# Patient Record
Sex: MALE | Race: WHITE | NOT HISPANIC OR LATINO | URBAN - NONMETROPOLITAN AREA
[De-identification: names, ages, dates, MRNs, and addresses within clinical notes are randomized per-mention and may not be internally consistent; named-entity substitution may affect disease eponyms.]

---

## 2018-01-29 ENCOUNTER — OFFICE VISIT CONVERTED (OUTPATIENT)
Dept: FAMILY MEDICINE CLINIC | Facility: CLINIC | Age: 48
End: 2018-01-29
Attending: NURSE PRACTITIONER

## 2018-02-08 ENCOUNTER — OFFICE VISIT CONVERTED (OUTPATIENT)
Dept: SURGERY | Facility: CLINIC | Age: 48
End: 2018-02-08
Attending: NURSE PRACTITIONER

## 2018-02-21 ENCOUNTER — CONVERSION ENCOUNTER (OUTPATIENT)
Dept: CARDIOLOGY | Facility: CLINIC | Age: 48
End: 2018-02-21

## 2018-02-21 ENCOUNTER — OFFICE VISIT CONVERTED (OUTPATIENT)
Dept: CARDIOLOGY | Facility: CLINIC | Age: 48
End: 2018-02-21
Attending: INTERNAL MEDICINE

## 2018-02-27 ENCOUNTER — OFFICE VISIT CONVERTED (OUTPATIENT)
Dept: ORTHOPEDIC SURGERY | Facility: CLINIC | Age: 48
End: 2018-02-27
Attending: ORTHOPAEDIC SURGERY

## 2018-03-07 ENCOUNTER — OFFICE VISIT CONVERTED (OUTPATIENT)
Dept: CARDIOLOGY | Facility: CLINIC | Age: 48
End: 2018-03-07
Attending: INTERNAL MEDICINE

## 2018-03-09 ENCOUNTER — OFFICE VISIT CONVERTED (OUTPATIENT)
Dept: FAMILY MEDICINE CLINIC | Facility: CLINIC | Age: 48
End: 2018-03-09
Attending: NURSE PRACTITIONER

## 2018-04-05 ENCOUNTER — OFFICE VISIT CONVERTED (OUTPATIENT)
Dept: ONCOLOGY | Facility: HOSPITAL | Age: 48
End: 2018-04-05
Attending: NURSE PRACTITIONER

## 2018-05-03 ENCOUNTER — OFFICE VISIT CONVERTED (OUTPATIENT)
Dept: FAMILY MEDICINE CLINIC | Facility: CLINIC | Age: 48
End: 2018-05-03
Attending: NURSE PRACTITIONER

## 2018-05-03 ENCOUNTER — CONVERSION ENCOUNTER (OUTPATIENT)
Dept: FAMILY MEDICINE CLINIC | Facility: CLINIC | Age: 48
End: 2018-05-03

## 2018-05-08 ENCOUNTER — OFFICE VISIT CONVERTED (OUTPATIENT)
Dept: ORTHOPEDIC SURGERY | Facility: CLINIC | Age: 48
End: 2018-05-08
Attending: ORTHOPAEDIC SURGERY

## 2018-05-10 ENCOUNTER — OFFICE VISIT CONVERTED (OUTPATIENT)
Dept: ONCOLOGY | Facility: HOSPITAL | Age: 48
End: 2018-05-10
Attending: NURSE PRACTITIONER

## 2018-06-22 ENCOUNTER — OFFICE VISIT CONVERTED (OUTPATIENT)
Dept: FAMILY MEDICINE CLINIC | Facility: CLINIC | Age: 48
End: 2018-06-22
Attending: NURSE PRACTITIONER

## 2018-07-09 ENCOUNTER — OFFICE VISIT CONVERTED (OUTPATIENT)
Dept: FAMILY MEDICINE CLINIC | Facility: CLINIC | Age: 48
End: 2018-07-09
Attending: NURSE PRACTITIONER

## 2018-07-19 ENCOUNTER — OFFICE VISIT CONVERTED (OUTPATIENT)
Dept: ONCOLOGY | Facility: HOSPITAL | Age: 48
End: 2018-07-19
Attending: INTERNAL MEDICINE

## 2018-08-07 ENCOUNTER — OFFICE VISIT CONVERTED (OUTPATIENT)
Dept: ORTHOPEDIC SURGERY | Facility: CLINIC | Age: 48
End: 2018-08-07
Attending: ORTHOPAEDIC SURGERY

## 2018-11-16 ENCOUNTER — CONVERSION ENCOUNTER (OUTPATIENT)
Dept: FAMILY MEDICINE CLINIC | Facility: CLINIC | Age: 48
End: 2018-11-16

## 2021-05-16 VITALS
RESPIRATION RATE: 20 BRPM | SYSTOLIC BLOOD PRESSURE: 102 MMHG | DIASTOLIC BLOOD PRESSURE: 44 MMHG | HEART RATE: 65 BPM | HEIGHT: 72 IN | TEMPERATURE: 97.5 F | WEIGHT: 242.06 LBS | OXYGEN SATURATION: 100 % | BODY MASS INDEX: 32.79 KG/M2

## 2021-05-16 VITALS
DIASTOLIC BLOOD PRESSURE: 52 MMHG | OXYGEN SATURATION: 98 % | HEART RATE: 62 BPM | SYSTOLIC BLOOD PRESSURE: 127 MMHG | WEIGHT: 262.31 LBS | HEIGHT: 72 IN | TEMPERATURE: 98 F | BODY MASS INDEX: 35.53 KG/M2

## 2021-05-16 VITALS
HEART RATE: 63 BPM | HEIGHT: 72 IN | OXYGEN SATURATION: 100 % | BODY MASS INDEX: 36.06 KG/M2 | DIASTOLIC BLOOD PRESSURE: 70 MMHG | RESPIRATION RATE: 20 BRPM | SYSTOLIC BLOOD PRESSURE: 116 MMHG | WEIGHT: 266.25 LBS | TEMPERATURE: 97.1 F

## 2021-05-16 VITALS — RESPIRATION RATE: 18 BRPM | BODY MASS INDEX: 33.86 KG/M2 | HEIGHT: 72 IN | WEIGHT: 250 LBS

## 2021-05-16 VITALS
RESPIRATION RATE: 20 BRPM | DIASTOLIC BLOOD PRESSURE: 60 MMHG | HEART RATE: 62 BPM | BODY MASS INDEX: 32.95 KG/M2 | OXYGEN SATURATION: 99 % | TEMPERATURE: 97.8 F | SYSTOLIC BLOOD PRESSURE: 102 MMHG | HEIGHT: 72 IN | WEIGHT: 243.31 LBS

## 2021-05-16 VITALS
BODY MASS INDEX: 35.49 KG/M2 | SYSTOLIC BLOOD PRESSURE: 120 MMHG | HEIGHT: 72 IN | RESPIRATION RATE: 20 BRPM | OXYGEN SATURATION: 100 % | WEIGHT: 262 LBS | TEMPERATURE: 97.7 F | HEART RATE: 66 BPM | DIASTOLIC BLOOD PRESSURE: 48 MMHG

## 2021-05-16 VITALS — WEIGHT: 262 LBS | BODY MASS INDEX: 35.49 KG/M2 | HEIGHT: 72 IN | RESPIRATION RATE: 18 BRPM

## 2021-05-16 VITALS
RESPIRATION RATE: 20 BRPM | HEART RATE: 71 BPM | OXYGEN SATURATION: 96 % | BODY MASS INDEX: 32.58 KG/M2 | SYSTOLIC BLOOD PRESSURE: 110 MMHG | DIASTOLIC BLOOD PRESSURE: 82 MMHG | HEIGHT: 72 IN | WEIGHT: 240.56 LBS | TEMPERATURE: 98.4 F

## 2021-05-16 VITALS
HEIGHT: 72 IN | WEIGHT: 271 LBS | HEART RATE: 68 BPM | BODY MASS INDEX: 36.7 KG/M2 | SYSTOLIC BLOOD PRESSURE: 118 MMHG | DIASTOLIC BLOOD PRESSURE: 68 MMHG

## 2021-05-16 VITALS
WEIGHT: 268 LBS | DIASTOLIC BLOOD PRESSURE: 68 MMHG | SYSTOLIC BLOOD PRESSURE: 100 MMHG | HEART RATE: 66 BPM | BODY MASS INDEX: 36.3 KG/M2 | HEIGHT: 72 IN

## 2021-05-16 VITALS — BODY MASS INDEX: 32.78 KG/M2 | HEIGHT: 72 IN | RESPIRATION RATE: 16 BRPM | WEIGHT: 242 LBS

## 2021-05-16 VITALS — HEIGHT: 72 IN | BODY MASS INDEX: 36.3 KG/M2 | RESPIRATION RATE: 18 BRPM | WEIGHT: 268 LBS

## 2021-05-28 VITALS
BODY MASS INDEX: 39.35 KG/M2 | WEIGHT: 265.65 LBS | DIASTOLIC BLOOD PRESSURE: 70 MMHG | HEART RATE: 102 BPM | WEIGHT: 266.54 LBS | TEMPERATURE: 98 F | OXYGEN SATURATION: 100 % | SYSTOLIC BLOOD PRESSURE: 123 MMHG | RESPIRATION RATE: 20 BRPM | DIASTOLIC BLOOD PRESSURE: 55 MMHG | HEART RATE: 72 BPM | SYSTOLIC BLOOD PRESSURE: 110 MMHG | BODY MASS INDEX: 39.48 KG/M2 | HEIGHT: 69 IN | HEIGHT: 69 IN | TEMPERATURE: 98.2 F | OXYGEN SATURATION: 93 %

## 2021-05-28 VITALS
OXYGEN SATURATION: 100 % | RESPIRATION RATE: 18 BRPM | DIASTOLIC BLOOD PRESSURE: 80 MMHG | BODY MASS INDEX: 36.31 KG/M2 | WEIGHT: 245.15 LBS | SYSTOLIC BLOOD PRESSURE: 122 MMHG | TEMPERATURE: 98 F | HEART RATE: 81 BPM | HEIGHT: 69 IN

## 2021-05-28 NOTE — PROGRESS NOTES
Patient: ELODIA DONG     Acct: [id number]     Report: #[id number]  UNIT #: [id number]     : 1970    Encounter Date:2018  PRIMARY CARE:   ***Signed***  --------------------------------------------------------------------------------------------------------------------  Visit Type      Established Patient Visit            Chief Complaint      ANEMIA            Referring Provider      TETE WHITING            Allergies      Coded Allergies:             NO KNOWN ALLERGIES (Unverified , 18)            Medications      Last Reconciled on 18 22:34 by ODALYS SAENZ MD      Spironolactone (Spironolactone*) 25 Mg Tablet      25 MG PO QDAY, #30 TAB 0 Refills         Reported         18       Cyanocobalamin (Vitamin B-12*) 1,000 Mcg Tablet.er      1000 MCG PO QDAY, #30 TAB.ER         Reported         3/27/18       Ferrous Sulfate (Ferrous Sulfate*) 325 Mg Tablet      325 MG PO QDAY, #30 TAB 0 Refills         Reported         3/27/18       Gabapentin (Gabapentin) 100 Mg Capsule      200 MG PO BID, #180 CAP 0 Refills         Reported         3/27/18       Aspirin (Herbst Aspirin*) 81 Mg Tab.chew      81 MG PO QDAY, TAB.CHEW         Reported         16       Furosemide* (Lasix*) 20 Mg Tablet      20 MG PO TID, #60 TAB 0 Refills         Reported         16       Nitroglycerin (Nitroglycerin 0.4 MG/HR*) 1 Each Patch.td24      1 PATCH TRANSDERM QDAY Y for CHEST PAIN, PATCH         Reported         16       Sertraline HCl (Sertraline*) 100 Mg Tablet      100 MG PO HS, #30 TAB 0 Refills         Reported         16       Lisinopril* (Lisinopril*) 2.5 Mg Tablet      2.5 MG PO QDAY, #30 TAB 0 Refills         Reported         16       Metoprolol Tartrate (Lopressor*) 25 Mg Tablet      25 MG PO QDAY, #30 TAB 0 Refills         Reported         16       Pantoprazole (Protonix*) 20 Mg Tablet.dr      20 MG PO QDAY, TAB         Reported         16       tiZANidine HCl  (tiZANidine HCl) 4 Mg Tablet      4 MG PO TID, TAB         Reported         8/28/16       Clopidogrel Bisulfate (Plavix) 75 Mg Tablet      75 MG PO QDAY, #30 TAB 0 Refills         Reported         8/28/16       Tamsulosin HCL (Tamsulosin*) 0.4 Mg Cap.er.24h      0.4 MG PO QDAY, #30 CAP 0 Refills         Reported         8/28/16       Atorvastatin (Atorvastatin) 80 Mg Tablet      80 MG PO HS, #30 TAB 0 Refills         Reported         8/28/16       Zolpidem Tartrate (Zolpidem Tartrate*) 10 Mg Tablet      10 MG PO HS for 30 Days, #60 TAB 0 Refills         Reported         8/28/16       Topiramate (Topiramate*) 200 Mg Tablet      300 MG PO QDAY, TAB         Reported         8/28/16      Medications Reviewed:  No Changes made to meds            History and Present Illness      Past Oncology Illness History      -August 28, 2016. WbC 9.15, Hemoglobin 12.40, Platelet count 124,000, absolute     lymphocyte count 480.       -April 27, 2017. WBC 4.66, Hemoglobin 12.30, Platelet count 156,000.       -February 26, 2018. WBC 3.87, Hemoglobin 13.40, Platelet count 128,000.      -April 5, 2018. WBC 4.35, Hemoglobin 12.90, Platelet count 127,000. Absolute     reticulocyte count 38.40. Sed rate is normal. BUN 26, Creatinine 1.24. Iron 76,     TIBC 389, Iron sat 20%, Ferritin 69. PSA is normal. Hepatitis negative, HIV     negative.       -May 2, 2018. Abdominal US: 2. The liver bile ducts are normal. 3. No evidence     of ascites. 4. The spleen is at the upper limits of normal in size measuring     13.2 cm x 5.2 cm. 5. 2.4 cm x 2.8 cm round focal area of hypo echogenicity in     the left kidney is probably benign and probably represents a cyst filled with     proteinaceous debris and less likely represents a solid mass this finding has     gotten larger since the previous CT of the abdomen pelvis performed on 3/3/2016     when it measured 1.5 cm in diameter.      -May 10, 2018. Presents for follow up for pancytopenia. History of  gastric     bypass surgery 15 years ago. Denies any recent infections or any related B     symptoms or worsening bruising. Abdominal US showed spleen is at the upper     limits of normal. Patient given education on bone marrow aspiration procedure     and is agreeable to do procedure.             Mr. Ino Lux is a very pleasant  male presenting for evaluation     for anemia. On review of previous blood counts, patient has history of at least     2 cell lines down since around February of 2016 with decreased lymphocyte     counts as well. Patient reports rectal bleeding with difficulty with urination     for the last 6 months. Recent endoscopy and colonoscopy last week shows     gastritis and hiatal hernia.   Medical history includes: osteoarthritis,     Congestive heart failure, myocardial infarction, hypertension, depression,     enlarged prostrate, gastritis, hyperlipidemia, lower extremity venous stasis.     Surgical history includes: gastric bypass surgery in 2003, gallbladder removal,     hand surgery, hernia repair. Familial medical history includes: father with     some type of spine cancer, mother with breast cancer, sister with unknown     cancer. Social history: Denies any tobacco abuse, alcohol use, or substance     use. Patient is disabled but used to be a . ROS: Positive for headaches,     abdominal pain, rectal bleeding, lower extremity swelling, neuropathy, back pain    , and fatigue. Patient denies any B symptoms, itching, recent infections,     bruising more than normal.            HPI - Oncology Interim       Patient presents with fatigue. Patient described fatigue pain has improved.     Feels like generalized weakness. Noticed it since (cancer) was diagnosed. This     is a intermittent fatigue, worsened with activities of daily living, and no     associated bone or muscle pain.            ECOG Performance Status      1            PAST, FAMILY   Past Medical History      Past  Medical History:  No Diabetes Type 1, No Diabetes Type 2, No Thyroid     Disease, No COPD, No Emphysema, No Hypertension, No Stroke, No High Cholesterol    , Heart Attack (2015), No Bleeding Condition, Low or High RBC Count, Low or     High WBC Count, Low or High Platelet Coun, No Hepatitis, No Kidney Disease, No     Depression, No Alzheimer's Disease, No Mental Disease, No Seizures, Arthritis,     No Osteoporosis, No Osteopenia, No Short of Air, No Sleep apnea, No Liver     Disease, No STD, No Enlarged Prostate, No Other      Hematology/oncology:  REPORTS HX OF: Anemia, DENIES HX OF: Previous Treatment     for CA, Bladder Cancer, Blood cancer, Brain cancer, Breast cancer, Coagulopathy    , Colon Cancer, Colorectal cancer, Endocrine cancer, Eye cancer, GI cancer,      cancer, Kidney cancer, Leukemia, Leukocytosis, Leukopenia, Liver cancer, Lung     cancer, Lymphoma, Musculoskeletal cancer, Myeloma, Neurologic cancer, Oral     cancer, Pancreatic Cancer, Prostate cancer, Skin cancer, Stomach cancer,     Testicular cancer, Thrombocytopenia, Thyroid cancer, Other cancer history,     Other hematologic history            Past Surgical History      REPORTS HX OF: Cholecystectomy, Other Past Surgical Hx (GASTRIC BYPASS, HERNIA     IN PELVIS REMOVED LIKE A BOWLING BALL, HAND SURGERY-HORSE RAN OVER), DENIES HX     OF: Cataract extraction, Thyroid surgery, Lung biopsy, CABG surgery, Coronary     stent, Valve replacement, Appendectomy, Splenectomy, Bladder surgery,     Nephrectomy, Joint replacement, Frature repair, Skin cancer removal, Melanoma     excision, Spinal surgery, Breast biopsy, Lumpectomy, Mastectomy, bilateral,     Mastectomy, right, Mastectomy, left, Hysterectomy, Peg Tube Placement, VAD     Placement, Biopsy            Family History      REPORTS HX OF: Breast cancer (MOTHER), Musculoskeletal Cancer (SPINE- FATHER),     DENIES HX OF: Anemia, Blood disorders, Blood Cancer, Cervical cancer,     Coagulopathy,  Colorectal cancer, Endocrine Cancer, Eye Cancer, GI Cancer,      Cancer, Kidney Cancer, Leukemia, Leukocytosis, Leukopenia, Liver Cancer, Lung     cancer, Lymphoma, Melanoma, Myeloma, Neurologic Cancer, Oral Cancer, Ovarian     cancer, Prostate cancer, Skin Cancer, Stomach Cancer, Testicular Cancer,     Thrombocytopenia, Thyroid cancer, Uterine cancer, Other Cancer History, Other     Hematology History            Social History      Lives independently:  Yes      Occupation:  disabled            Tobacco Use      Tobacco status:  Never smoker            Alcohol Use      Alcohol intake:  None            Substance Use      Substance use:  Denies use            REVIEW OF SYSTEMS      General:  Denies: Appetite change, Excessive sweating, Fatigue, Fever, Night     sweats, Weight gain, Weight loss, Other      Eyes:  Denies: Blurred vision, Corrective lenses, Diplopia, Eye irritation, Eye     pain, Eye redness, Spots in vision, Vision loss, Other      Ears, nose, mouth, throat:  Denies: Headache, Seizures, Visual Changes, Hearing     loss, Sinus Congestion, Hoarseness, Sore throat, Other      Cardiovascular:  Denies: Chest pain, Irregular heartbeat, Palpitations, Swollen     ankles/legs, Other      Respiratory:  Denies: Chest pain, Shortness of Air, Productive cough, Coughing     blood, Other      Gastrointestinal:  Denies: Nausea, Vomiting, Problem swallowing, Frequent     heartburn, Constipation, Diarrhea, Tarry stools, Bloody stools, Unable to     control bowels, Other      Kidney/Bladder:  Denies: Painful Urination, Blood in Urine, Incontinence,     Frequent Urination, Decreased Urine Stream, Other      Musculoskeletal:  Denies: New Back pain, Leg Cramps, Painful Joints, Swollen     Joints, Muscle Pain, Muscle weakness, Other      Skin:  DENIES: Bruises Easily, Hair changes, Lesions/changes in moles, Nail     changes, Pigment changes, Rash, Other      Neurological:  Denies: Dizziness, Fainting, Numbness\Tingling,  Paralysis,     Seizures, Other      Psychiatric:  Complains of: AAO X 3, Denies: Anxiety, Panic attacks, Depression    , Memory loss, Other      Endocrine:  DiabetesThyroid DisorderOsteoporosisEndocrine Other      Hematologic/lymphatic:  Denies: Bruising, Bleeding, Enlarged Lymph Nodes,     Recurrent infections, Other            VITAL SIGNS,PAIN/FATIGUE SCORE      Vitals      Height 5 ft 8.50 in / 174 cm      Weight 245 lbs 2.424 oz / 111.2 kg      BSA 2.36 m2      BMI 36.7 kg/m2      Temperature 98 F / 36.67 C - Temporal      Pulse 81      Respirations 18      Blood Pressure 122/80 Sitting, Right Arm      Pulse Oximetry 100%, RM AIR            Pain Score      Experiencing any pain?:  No            Fatigue Score      Experiencing any fatigue?:  No            General Appearance:  Alert, Oriented X3, Cooperative      Eyes:  Anicteric Sclerae, Moist Conjunctiva, PERRLA      HEENT:  Orophraynx clear, No Erythema, No Exudates      Neck:  Supple, Full ROM, No No Masses or JVD      Respiratory:  CTAB, No Diminished Breath, No Rales      Breast\Chest:  Symmetrical, No Nipple Discharge, No Masses      Abdomen\Gastro:  Soft, No NABS, No Masses      Cardio:  RRR, No Murmur, No, Peripheral Edema, Normal PMI      Skin:  Normal Temperature, Normal Tone, Normal Texture and Turgor      Psychiatric:  Appropriate Affect, Intact Judgement, AAO x3      Neuro:  Cranial Nerve II-XII Inta, No Focal Sensory Deficit      Muscularskeletal:  Normal Gait and Station, Full ROM of extremeties, Full     muscle strength\tone      Extremities:  No Digital Cyanosis, No Digital Ischemia, Pedal Pulses Intact      Lymphatic:  No Cervical, No Supraclavicular, No Infraclavicular, No Axillary            PREVENTION      Hx Influenza Vaccination:  Yes      Date Influenza Vaccine Given:  Oct 1, 2017      Influenza Vaccine Declined:  No      2 or More Falls Past Year?:  No      Fall Past Year with Injury?:  No      Hx Pneumococcal Vaccination:  No       Encouraged to follow-up with:  PCP regarding preventative exams.      Chart initiated by      MICHELLE BARGER MA            IMPRESSION/PLAN      Plan      -Pancytopenia      -Differential diagnosis includes: Medication side effects, nutritional     deficiencies, MDS, leukemia, lymphoma, and plasma cell dyscrasia.       -CBC with differential to evaluate for worsening pancytopenia. CBC still shows     pancytopenia.       -Bone marrow on May  showed mild hypocellular bone marrow.      -Check CBC today.            -Anemia      -Check iron panel and CBC today.  Also check copper and zinc.      -Fatigue is worsening.      -Continue observation            -Thrombocytopenia      -No bleeding.      -Check peripheral smear.  Check CBC today.            -Today's Evaluation      -Patient's imaging exams, blood tests, physicians' notes, and any new findings     since our last visit were reviewed today to reassess patient's medical     treatment plan.      -Old medical records were reviewed and summarized in chronological order in the     HPI today to maintain an updated medical record.       -Patient's radiology imaging tests from our last visit were reviewed     independently by me by direct visualization of the images.        -Patients current lab tests and medications were carefully reviewed to evaluate     patient's current treatment plan today.       -Patient was advised to call us right away if there are any new symptoms for an     urgent visit for further evaluation. Patient voiced understanding and agreed to     do so.            Diagnosis      Notes      New Diagnostics      * Hepatitis Panel 4 Acute, As Soon As Possible       Dx: Pancytopenia - D61.818      * CMP Comp Metabolic Panel, Stat       Dx: Pancytopenia - D61.818      * Iron Profile, Stat       Dx: Pancytopenia - D61.818      * B12    Dx: Pancytopenia - D61.818      * Ferritin Level, As Soon As Possible       Dx: Pancytopenia - D61.818      * LDH,  Stat       Dx: Pancytopenia - D61.818      * CBC, As Soon As Possible       Dx: Pancytopenia - D61.818      * Reticulocyte Count, As Soon As Possible       Dx: Pancytopenia - D61.818      * Zinc  Serum, Stat       Dx: Pancytopenia - D61.818      * Copper Serum, Stat       Dx: Pancytopenia - D61.818      * Path Review Peripheral Smear, Stat       Dx: Pancytopenia - D61.818            Patient Education      Patient Education Provided:  Yes                 Disclaimer: Converted document may not contain table formatting or lab diagrams. Please see [employer] for the authenticated document.

## 2021-05-28 NOTE — PROGRESS NOTES
Patient: ELODIA DONG     Acct: [id number]     Report: #[id number]  UNIT #: [id number]     : 1970    Encounter Date:2018  PRIMARY CARE:   ***Signed***  --------------------------------------------------------------------------------------------------------------------  ADDENDUM: 18 1852          Addendum: Kevin Pittman on 18 @ 18:52             Patient was seen in clinic, physical exam was performed, lab and imaging tests     were noted, and above medical documentation was also done by me.  Thank you     very much for the opportunity to participate in your patient's care.  Warm     Regards.             Kevin Pittman MD FACP      Board Certified Hematologist      Board Certified Medical Oncologist     Chief Complaint      2018      ANEMIA            Current Plan      Pancytopenia      Differential diagnosis includes: Medication side effects, nutritional     deficiencies, MDS, leukemia, lymphoma, and plasma cell dyscrasia.       CBC with differential to evaluate for worsening pancytopenia. CBC still shows     pancytopenia.       Peripheral smear to evaluate for abnormal cell morphology on slide.       CMP, LDH to evaluate for hepatic or renal dysfunction.       LDH and haptoglobin to evaluate for hemolytic anemia.       Vitamin B-12, folate to evaluate for megaloblastic anemia.       Copper, zinc to evaluate for nutritional deficiency.       Flow cytometry to evaluate for abnormal circulating leukemia and lymphoma cells     in the body.       Reticulocyte count to evaluate for bone marrow function.       Ferritin, iron panel to evaluate for iron deficiency anemia. Iron studies are     normal. Taking oral iron.       Sed rate, RA profile, LUNA to evaluate for autoimmune, rheumatological     disorders. Sed rate is normal.       Urinalysis to evaluate for proteinuria and hematuria.       HIV testing and hepatitis panel to evaluate for underlying HIV or hepatitis     infections. HIV  and hepatitis were negative.       SPEP, serum free light chains to evaluate for plasma cell dyscrasia.       PSA screening to evaluate for prostrate cancer.       H-pylori to evaluate for H-pylori infection.       Celiac panel to evaluate for celiac disease.       Abdominal US to evaluate for hepatosplenomegaly.       If all testing is returned as normal, patient will likely need a bone marrow     aspiration.       Return in 4 weeks for follow up with lab results and plan of care.      Pancytopenia - D61.818            Rectal bleeding - K62.5            Difficulty in urination - R39.198            Notes      New Medications      * Spironolactone (Spironolactone*) 25 MG TABLET: 25 MG PO QDAY #30      New Diagnostics      * Hepatitis Panel 4 Acute, As Soon As Possible       Dx: Pancytopenia - D61.818      * LUNA Screen/Reflex Hep Titer, Stat       Dx: Pancytopenia - D61.818      * CMP Comp Metabolic Panel, Stat       Dx: Pancytopenia - D61.818      * Zinc  Serum, Stat       Dx: Pancytopenia - D61.818      * Copper Serum, Stat       Dx: Pancytopenia - D61.818      * Iron Profile, Stat       Dx: Pancytopenia - D61.818      * RA Profile, Stat       Dx: Pancytopenia - D61.818      * Serum Ferritin Level, As Soon As Possible       Dx: Pancytopenia - D61.818      * Psa Screening, As Soon As Possible       Dx: Pancytopenia - D61.818      * LDH, Stat       Dx: Pancytopenia - D61.818      * CBC, As Soon As Possible       Dx: Pancytopenia - D61.818      * Reticulocyte Count, As Soon As Possible       Dx: Pancytopenia - D61.818      * Sed Rate, As Soon As Possible       Dx: Pancytopenia - D61.818      * Hiv 1 By Eia W/West , Routine       Dx: Pancytopenia - D61.818      * FREE KAPPA LAMBDA LT CHAINS QU, Stat       Dx: Pancytopenia - D61.818      * Flow Cytometry Leukemia Lymph, Stat       Dx: Pancytopenia - D61.818      * Path Review Peripheral Smear, Stat       Dx: Pancytopenia - D61.818      * Haptoglobin, Stat       Dx:  Pancytopenia - D61.818      * Erythropoietin, Stat       Dx: Pancytopenia - D61.818      * Urinalysis       Dx: Pancytopenia - D61.818      * H Pylori Stool Anti, Stat       Dx: Pancytopenia - D61.818      * PROTEIN ELECTROPH SERUM, Stat       Dx: Pancytopenia - D61.818      * ABDOMEN COMPLETE, SCHEDULED PROCEDURE       Dx: Pancytopenia - D61.818      * Celiac Disease, Stat       Dx: Pancytopenia - D61.818      Intensive Monitor for Toxicity:  Labs Reviewed, Meds\Narcotics Reviewed      Labs Reviewed During Visit?:  Yes      Other      Patient was seen and examined by APRN. Patient was seen in clinic, physical     exam was performed, lab and imaging tests were noted, and above medical     documentation was also done by me. Patient information and plan of care was     reviewed with Dr. Pittman.approx.  Thank you very much for the opportunity to participate     in your patient's care.approx.  Warm Regards.      Patient Education Provided:  Yes      Additional information      Patient was seen and examined by APRN. Patient was seen in clinic, physical     exam was performed, lab and imaging tests were noted, and above medical     documentation was also done by me. Patient information and plan of care was     reviewed with Dr. Pittman.approx.  Thank you very much for the opportunity to participate     in your patient's care.approx.  Warm Regards.      ECOG Score:  1            History and Present Illness      -August 28, 2016. WbC 9.15, Hemoglobin 12.40, Platelet count 124,000, absolute     lymphocyte count 480.       -April 27, 2017. WBC 4.66, Hemoglobin 12.30, Platelet count 156,000.       -February 26, 2018. WBC 3.87, Hemoglobin 13.40, Platelet count 128,000.      -April 5, 2018. WBC 4.35, Hemoglobin 12.90, Platelet count 127,000. Absolute     reticulocyte count 38.40. Sed rate is normal. BUN 26, Creatinine 1.24. Iron 76,     TIBC 389, Iron sat 20%, Ferritin 69. PSA is normal. Hepatitis negative, HIV     negative.              Mr. Ino Lux is a very pleasant  male presenting for evaluation     for anemia. On review of previous blood counts, patient has history of at least     2 cell lines down since around February of 2016 with decreased lymphocyte     counts as well. Patient reports rectal bleeding with difficulty with urination     for the last 6 months. Recent endoscopy and colonoscopy last week shows     gastritis and hiatal hernia.   Medical history includes: osteoarthritis,     Congestive heart failure, myocardial infarction, hypertension, depression,     enlarged prostrate, gastritis, hyperlipidemia, lower extremity venous stasis.     Surgical history includes: gastric bypass surgery in 2003, gallbladder removal,     hand surgery, hernia repair. Familial medical history includes: father with     some type of spine cancer, mother with breast cancer, sister with unknown     cancer. Social history: Denies any tobacco abuse, alcohol use, or substance     use. Patient is disabled but used to be a . ROS: Positive for headaches,     abdominal pain, rectal bleeding, lower extremity swelling, neuropathy, back pain    , and fatigue. Patient denies any B symptoms, itching, recent infections,     bruising more than normal.            Vitals      Height 5 ft 8.50 in / 174 cm      Weight 266 lbs 8.578 oz / 120.9 kg      BSA 2.47 m2      BMI 39.9 kg/m2      Temperature 98.0 F / 36.67 C - Temporal      Pulse 72      Blood Pressure 110/55 Sitting, Left Arm      Pulse Oximetry 93%, ROOM AIR            Allergies      Coded Allergies:             NO KNOWN ALLERGIES (Unverified , 4/5/18)            Medications      Last Reconciled on 4/6/18 12:53 by SUDEEP ROE      Spironolactone (Spironolactone*) 25 Mg Tablet      25 MG PO QDAY, #30 TAB 0 Refills         Reported         4/5/18       Cyanocobalamin (Vitamin B-12*) 1,000 Mcg Tablet.er      1000 MCG PO QDAY, #30 TAB.ER         Reported         3/27/18       Ferrous  Sulfate (Ferrous Sulfate*) 325 Mg Tablet      325 MG PO QDAY, #30 TAB 0 Refills         Reported         3/27/18       Gabapentin (Gabapentin) 100 Mg Capsule      200 MG PO BID, #180 CAP 0 Refills         Reported         3/27/18       Aspirin (Macdona Aspirin*) 81 Mg Tab.chew      81 MG PO QDAY, TAB.CHEW         Reported         8/28/16       Furosemide* (Lasix*) 20 Mg Tablet      20 MG PO TID, #60 TAB 0 Refills         Reported         8/28/16       Nitroglycerin (Nitroglycerin 0.4 MG/HR*) 1 Each Patch.td24      1 PATCH TRANSDERM QDAY Y for CHEST PAIN, PATCH         Reported         8/28/16       Sertraline HCl (Sertraline*) 100 Mg Tablet      100 MG PO HS, #30 TAB 0 Refills         Reported         8/28/16       Lisinopril* (Lisinopril*) 2.5 Mg Tablet      2.5 MG PO QDAY, #30 TAB 0 Refills         Reported         8/28/16       Metoprolol Tartrate (Lopressor*) 25 Mg Tablet      25 MG PO QDAY, #30 TAB 0 Refills         Reported         8/28/16       Pantoprazole (Protonix*) 20 Mg Tablet.dr      20 MG PO QDAY, TAB         Reported         8/28/16       tiZANidine HCl (tiZANidine HCl) 4 Mg Tablet      4 MG PO TID, TAB         Reported         8/28/16       Clopidogrel Bisulfate (Plavix) 75 Mg Tablet      75 MG PO QDAY, #30 TAB 0 Refills         Reported         8/28/16       Tamsulosin HCL (Tamsulosin*) 0.4 Mg Cap.er.24h      0.4 MG PO QDAY, #30 CAP 0 Refills         Reported         8/28/16       Atorvastatin (Atorvastatin) 80 Mg Tablet      80 MG PO HS, #30 TAB 0 Refills         Reported         8/28/16       Zolpidem Tartrate (Zolpidem Tartrate*) 10 Mg Tablet      10 MG PO HS for 30 Days, #60 TAB 0 Refills         Reported         8/28/16       Topiramate (Topiramate*) 200 Mg Tablet      200 MG PO QDAY, TAB         Reported         8/28/16            General Information      Referring Provider:  TETE WHITING      Primary Provider:  TETE WHITING            Pain and Fatigue Scales      Pain Assessment:            Experiencing any pain?:  No         Pain Scale Used:  Numerical         Pain Intensity:  0      Fatigue:           Experiencing any fatigue?:  Yes         Fatigue (0-10 scale):  5            Past Medical History      No Diabetes Type 1, No Diabetes Type 2, No Thyroid Disease, No COPD, No     Emphysema, No Hypertension, No Stroke, No High Cholesterol, Yes Heart Attack (    2015), No Bleeding Condition, No Low or High RBC Count, No Low or High WBC Count    , No Low or High Platelet Coun, No Hepatitis, No Kidney Disease, No Depression,     No Alzheimer's Disease, No Mental Disease, No Seizures, Yes Arthritis, No     Osteoporosis, No Osteopenia, No Short of Air, No Sleep apnea, No Liver Disease,     No STD, No Enlarged Prostate, No Other      Hematology/oncology:  REPORTS HX OF: Anemia, DENIES HX OF: Previous Treatment     for CA, Bladder Cancer, Blood cancer, Brain cancer, Breast cancer, Coagulopathy    , Colon Cancer, Colorectal cancer, Endocrine cancer, Eye cancer, GI cancer,      cancer, Kidney cancer, Leukemia, Leukocytosis, Leukopenia, Liver cancer, Lung     cancer, Lymphoma, Musculoskeletal cancer, Myeloma, Neurologic cancer, Oral     cancer, Pancreatic Cancer, Prostate cancer, Skin cancer, Stomach cancer,     Testicular cancer, Thrombocytopenia, Thyroid cancer, Other cancer history,     Other hematologic history            Past Surgical History      REPORTS HX OF: Cholecystectomy, Other Past Surgical Hx (GASTRIC BYPASS, HERNIA     IN PELVIS REMOVED LIKE A BOWLING BALL, HAND SURGERY-HORSE RAN OVER), DENIES HX     OF: Cataract extraction, Thyroid surgery, Lung biopsy, CABG surgery, Coronary     stent, Valve replacement, Appendectomy, Splenectomy, Bladder surgery,     Nephrectomy, Joint replacement, Frature repair, Skin cancer removal, Melanoma     excision, Spinal surgery, Breast biopsy, Lumpectomy, Mastectomy, bilateral,     Mastectomy, right, Mastectomy, left, Hysterectomy, Peg Tube Placement, VAD      Placement, Biopsy            Family History      REPORTS HX OF: Breast cancer (MOTHER), Musculoskeletal Cancer (SPINE- FATHER),     DENIES HX OF: Anemia, Blood disorders, Blood Cancer, Cervical cancer,     Coagulopathy, Colorectal cancer, Endocrine Cancer, Eye Cancer, GI Cancer,      Cancer, Kidney Cancer, Leukemia, Leukocytosis, Leukopenia, Liver Cancer, Lung     cancer, Lymphoma, Melanoma, Myeloma, Neurologic Cancer, Oral Cancer, Ovarian     cancer, Prostate cancer, Skin Cancer, Stomach Cancer, Testicular Cancer,     Thrombocytopenia, Thyroid cancer, Uterine cancer, Other Cancer History, Other     Hematology History            Social History      Yes      disabled      Lives by self.            Tobacco Use      Tobacco status:  Never smoker            Alcohol Use      Alcohol intake:  None            Substance Use      Substance use:  Denies use            ROS      General:  Complains of: Fatigue, Denies: Appetite change, Excessive sweating,     Fever, Night sweats, Weight gain, Weight loss      Eyes:  Denies: Blurred vision, Corrective lenses, Diplopia, Eye irritation, Eye     pain, Eye redness, Spots in vision, Vision loss      Ears, nose, mouth, throat:  Complains of: Headache, Denies: Seizures, Visual     Changes, Hearing loss, Sinus Congestion, Hoarseness, Sore throat      Cardiovascular:  Complains of: Swollen ankles/legs, Denies: Chest pain,     Irregular heartbeat, Palpitations, Other      Respiratory:  Complains of: Shortness of Air, Denies: Chest pain, Productive     cough, Coughing blood      Gastrointestinal:  Complains of: Bloody stools, Denies: Nausea, Vomiting,     Problem swallowing, Frequent heartburn, Constipation, Diarrhea, Tarry stools,     Unable to control bowels      Kidney/Bladder:  Complains of: Decreased Urine Stream, Denies: Painful Urination    , Blood in Urine, Incontinence, Frequent Urination, Other      Musculoskeletal:  Complains of: Painful Joints, Denies: New Back pain, Leg      Cramps, Swollen Joints, Muscle Pain, Muscle weakness, Other      Skin:  COMPLAINS OF: Other (LEGS SKIN DISCOLORATION), DENIES: Bruises Easily,     Hair changes, Lesions/changes in moles, Nail changes, Pigment changes, Rash      Neurological:  Complains of: Numbness\Tingling, Denies: Dizziness, Fainting,     Paralysis, Seizures, Other      Psychiatric:  Complains of: AAO X 3, Denies: Anxiety, Panic attacks, Depression    , Memory loss, Other      Endocrine:  DiabetesThyroid DisorderOsteoporosisEndocrine Other      Hematologic/lymphatic:  Complains of: Other, Denies: Bruising, Bleeding,     Enlarged Lymph Nodes, Recurrent infections            General Appearance:  Alert, Oriented X3, Cooperative, No acute distress      Eyes:  Anicteric Sclerae, Moist Conjunctiva, PERRLA      HEENT:  Orophraynx clear, No Erythema      Neck:  Supple, Full ROM, No Masses or JVD      Respiratory:  CTAB, Diminished Breath, No Rales, No Rhonchi      Breast\Chest:  Symmetrical      Abdomen\Gastro:  Soft, No NABS, No Hernias      Cardio:  RRR, No Murmur, No, Peripheral Edema      Skin:  Normal Temperature, Normal Tone, Normal Texture and Turgor      Psychiatric:  Appropriate Affect, Intact Judgement, AAO x3      Neuro:  Cranial Nerve II-XII Inta, No Focal Sensory Deficit      Genitourinary:  No Ryan Catheter, No Bladder Distention      Muscularskeletal:  Normal Gait and Station, Full ROM of extremeties, Full     muscle strength\tone      Extremities:  Pedal Pulses Intact, Pedal Pulses Symetrical, Normal Gait and     station, No No Digital Cyanosis      Lymphatic:  No Cervical, No Supraclavicular, No Infraclavicular, No Axillary            Hx Influenza Vaccination:  Yes      Date Influenza Vaccine Given:  Oct 1, 2017      Influenza Vaccine Declined:  No      2 or More Falls Past Year?:  No      Fall Past Year with Injury?:  No      Hx Pneumococcal Vaccination:  No      Encouraged to follow-up with:  PCP regarding preventative exams.       Chart initiated by      PADMINI GERMAIN CMA                 Disclaimer: Converted document may not contain table formatting or lab diagrams. Please see [employer] [facility] for the authenticated document.

## 2021-05-28 NOTE — PROGRESS NOTES
Patient: ELODIA DONG     Acct: [id number]     Report: #[id number]  UNIT #: [id number]     : 1970    Encounter Date:05/10/2018  PRIMARY CARE:   ***Signed***  --------------------------------------------------------------------------------------------------------------------  Chief Complaint      May 10, 2018      ANEMIA            Current Plan      Pancytopenia      Differential diagnosis includes: Medication side effects, nutritional     deficiencies, MDS, leukemia, lymphoma, and plasma cell dyscrasia.       CBC with differential to evaluate for worsening pancytopenia. CBC still shows     pancytopenia.       Peripheral smear to evaluate for abnormal cell morphology on slide. Peripheral     smear shows all cell lines down with macrocytosis.       CMP, LDH to evaluate for hepatic or renal dysfunction.       LDH and haptoglobin to evaluate for hemolytic anemia.       Vitamin B-12, folate to evaluate for megaloblastic anemia. Vitamin and B-12     were normal.       Copper, zinc to evaluate for nutritional deficiency. Copper and zinc were     normal.       Flow cytometry to evaluate for abnormal circulating leukemia and lymphoma cells     in the body. Flow cytometry was normal.       Reticulocyte count to evaluate for bone marrow function. Reticulocyte count was     decreased.       Ferritin, iron panel to evaluate for iron deficiency anemia. Iron studies are     normal. Taking oral iron.       Sed rate, RA profile, LUNA to evaluate for autoimmune, rheumatological     disorders. Sed rate is normal.       Urinalysis to evaluate for proteinuria and hematuria.       HIV testing and hepatitis panel to evaluate for underlying HIV or hepatitis     infections. HIV and hepatitis were negative.       SPEP, serum free light chains were normal.       PSA screening to evaluate for prostrate cancer.       H-pylori to evaluate for H-pylori infection.       Celiac panel to evaluate for celiac disease.       Abdominal US  showed enlarging spleen.       If all testing is returned as normal, patient will likely need a bone marrow     aspiration.       Return in 4 weeks for follow up with lab results and plan of care.             Interval Changes      -Patient will be scheduled for a bone marrow aspiration with interventional     radiology.       -Anti-intrinsic factor and anti-parietal antibodies to evaluate for pernicious     anemia.       -CBC with diff to evaluate for worsening pancytopenia.       -Return in 1 month to see Dr. Pittman.      Notes      Changed Medications      * Topiramate (Topiramate*) 200 MG TABLET: 300 MG PO QDAY      New Diagnostics      * Thyroid Profile       Dx: Pancytopenia - D61.818      * B12    Dx: Pancytopenia - D61.818      * CBC, As Soon As Possible       Dx: Pancytopenia - D61.818      * Path Review Peripheral Smear, As Soon As Possible       Dx: Pancytopenia - D61.818      * Anti Parietal Cell A, As Soon As Possible       Dx: Pancytopenia - D61.818      * Intrinsic Factor Ab/, As Soon As Possible       Dx: Pancytopenia - D61.818      New Office Procedures      * Bone Marrow Aspiration, As Soon As Possible       Dx: Pancytopenia - D61.818      Intensive Monitor for Toxicity:  Labs Reviewed, Meds\Narcotics Reviewed      Labs Reviewed During Visit?:  Yes            Patient Education:        Bone Marrow Biopsy      Patient Education Provided:  Yes            ECOG      ECOG Performance Status:  1            History and Present Illness      -August 28, 2016. WbC 9.15, Hemoglobin 12.40, Platelet count 124,000, absolute     lymphocyte count 480.       -April 27, 2017. WBC 4.66, Hemoglobin 12.30, Platelet count 156,000.       -February 26, 2018. WBC 3.87, Hemoglobin 13.40, Platelet count 128,000.      -April 5, 2018. WBC 4.35, Hemoglobin 12.90, Platelet count 127,000. Absolute     reticulocyte count 38.40. Sed rate is normal. BUN 26, Creatinine 1.24. Iron 76,     TIBC 389, Iron sat 20%, Ferritin 69. PSA is  normal. Hepatitis negative, HIV     negative.       -May 2, 2018. Abdominal US: 2. The liver bile ducts are normal. 3. No evidence     of ascites. 4. The spleen is at the upper limits of normal in size measuring     13.2 cm x 5.2 cm. 5. 2.4 cm x 2.8 cm round focal area of hypo echogenicity in     the left kidney is probably benign and probably represents a cyst filled with     proteinaceous debris and less likely represents a solid mass this finding has     gotten larger since the previous CT of the abdomen pelvis performed on 3/3/2016     when it measured 1.5 cm in diameter.      -May 10, 2018. Presents for follow up for pancytopenia. History of gastric     bypass surgery 15 years ago. Denies any recent infections or any related B     symptoms or worsening bruising. Abdominal US showed spleen is at the upper     limits of normal. Patient given education on bone marrow aspiration procedure     and is agreeable to do procedure.             Mr. Ino Lux is a very pleasant  male presenting for evaluation     for anemia. On review of previous blood counts, patient has history of at least     2 cell lines down since around February of 2016 with decreased lymphocyte     counts as well. Patient reports rectal bleeding with difficulty with urination     for the last 6 months. Recent endoscopy and colonoscopy last week shows     gastritis and hiatal hernia.   Medical history includes: osteoarthritis,     Congestive heart failure, myocardial infarction, hypertension, depression,     enlarged prostrate, gastritis, hyperlipidemia, lower extremity venous stasis.     Surgical history includes: gastric bypass surgery in 2003, gallbladder removal,     hand surgery, hernia repair. Familial medical history includes: father with     some type of spine cancer, mother with breast cancer, sister with unknown     cancer. Social history: Denies any tobacco abuse, alcohol use, or substance     use. Patient is disabled but  used to be a . ROS: Positive for headaches,     abdominal pain, rectal bleeding, lower extremity swelling, neuropathy, back pain    , and fatigue. Patient denies any B symptoms, itching, recent infections,     bruising more than normal.            Vitals      Height 5 ft 8.50 in / 174 cm      Weight 265 lbs 10.469 oz / 120.5 kg      BSA 2.47 m2      BMI 39.8 kg/m2      Temperature 98.2 F / 36.78 C - Temporal      Pulse 102      Respirations 20      Blood Pressure 123/70 Sitting, Left Arm      Pulse Oximetry 100%, RM AIR            Allergies      Coded Allergies:             NO KNOWN ALLERGIES (Unverified , 4/5/18)            Medications      Last Reconciled on 5/10/18 12:43 by SUDEEP ROE      Spironolactone (Spironolactone*) 25 Mg Tablet      25 MG PO QDAY, #30 TAB 0 Refills         Reported         4/5/18       Cyanocobalamin (Vitamin B-12*) 1,000 Mcg Tablet.er      1000 MCG PO QDAY, #30 TAB.ER         Reported         3/27/18       Ferrous Sulfate (Ferrous Sulfate*) 325 Mg Tablet      325 MG PO QDAY, #30 TAB 0 Refills         Reported         3/27/18       Gabapentin (Gabapentin) 100 Mg Capsule      200 MG PO BID, #180 CAP 0 Refills         Reported         3/27/18       Aspirin (Maury Aspirin*) 81 Mg Tab.chew      81 MG PO QDAY, TAB.CHEW         Reported         8/28/16       Furosemide* (Lasix*) 20 Mg Tablet      20 MG PO TID, #60 TAB 0 Refills         Reported         8/28/16       Nitroglycerin (Nitroglycerin 0.4 MG/HR*) 1 Each Patch.td24      1 PATCH TRANSDERM QDAY Y for CHEST PAIN, PATCH         Reported         8/28/16       Sertraline HCl (Sertraline*) 100 Mg Tablet      100 MG PO HS, #30 TAB 0 Refills         Reported         8/28/16       Lisinopril* (Lisinopril*) 2.5 Mg Tablet      2.5 MG PO QDAY, #30 TAB 0 Refills         Reported         8/28/16       Metoprolol Tartrate (Lopressor*) 25 Mg Tablet      25 MG PO QDAY, #30 TAB 0 Refills         Reported         8/28/16        Pantoprazole (Protonix*) 20 Mg Tablet.dr      20 MG PO QDAY, TAB         Reported         8/28/16       tiZANidine HCl (tiZANidine HCl) 4 Mg Tablet      4 MG PO TID, TAB         Reported         8/28/16       Clopidogrel Bisulfate (Plavix) 75 Mg Tablet      75 MG PO QDAY, #30 TAB 0 Refills         Reported         8/28/16       Tamsulosin HCL (Tamsulosin*) 0.4 Mg Cap.er.24h      0.4 MG PO QDAY, #30 CAP 0 Refills         Reported         8/28/16       Atorvastatin (Atorvastatin) 80 Mg Tablet      80 MG PO HS, #30 TAB 0 Refills         Reported         8/28/16       Zolpidem Tartrate (Zolpidem Tartrate*) 10 Mg Tablet      10 MG PO HS for 30 Days, #60 TAB 0 Refills         Reported         8/28/16       Topiramate (Topiramate*) 200 Mg Tablet      300 MG PO QDAY, TAB         Reported         8/28/16            General Information      Referring Provider:  TETE WHITING      Primary Provider:  TETE WHITING            Pain and Fatigue Scales      Pain Assessment:           Experiencing any pain?:  No      Fatigue:           Experiencing any fatigue?:  No            PAST, FAMILY   Past Medical History      Past Medical History:  No Diabetes Type 1, No Diabetes Type 2, No Thyroid     Disease, No COPD, No Emphysema, No Hypertension, No Stroke, No High Cholesterol    , Yes Heart Attack (2015), No Bleeding Condition, Yes Low or High RBC Count,     Yes Low or High WBC Count, Yes Low or High Platelet Coun, No Hepatitis, No     Kidney Disease, No Depression, No Alzheimer's Disease, No Mental Disease, No     Seizures, Yes Arthritis, No Osteoporosis, No Osteopenia, No Short of Air, No     Sleep apnea, No Liver Disease, No STD, No Enlarged Prostate, No Other      Hematology/oncology:  REPORTS HX OF: Anemia, DENIES HX OF: Previous Treatment     for CA, Bladder Cancer, Blood cancer, Brain cancer, Breast cancer, Coagulopathy    , Colon Cancer, Colorectal cancer, Endocrine cancer, Eye cancer, GI cancer,      cancer, Kidney cancer,  Leukemia, Leukocytosis, Leukopenia, Liver cancer, Lung     cancer, Lymphoma, Musculoskeletal cancer, Myeloma, Neurologic cancer, Oral     cancer, Pancreatic Cancer, Prostate cancer, Skin cancer, Stomach cancer,     Testicular cancer, Thrombocytopenia, Thyroid cancer, Other cancer history,     Other hematologic history            Past Surgical History      REPORTS HX OF: Cholecystectomy, Other Past Surgical Hx (GASTRIC BYPASS, HERNIA     IN PELVIS REMOVED LIKE A BOWLING BALL, HAND SURGERY-HORSE RAN OVER), DENIES HX     OF: Cataract extraction, Thyroid surgery, Lung biopsy, CABG surgery, Coronary     stent, Valve replacement, Appendectomy, Splenectomy, Bladder surgery,     Nephrectomy, Joint replacement, Frature repair, Skin cancer removal, Melanoma     excision, Spinal surgery, Breast biopsy, Lumpectomy, Mastectomy, bilateral,     Mastectomy, right, Mastectomy, left, Hysterectomy, Peg Tube Placement, VAD     Placement, Biopsy            Family History      REPORTS HX OF: Breast cancer (MOTHER), Musculoskeletal Cancer (SPINE- FATHER),     DENIES HX OF: Anemia, Blood disorders, Blood Cancer, Cervical cancer,     Coagulopathy, Colorectal cancer, Endocrine Cancer, Eye Cancer, GI Cancer,      Cancer, Kidney Cancer, Leukemia, Leukocytosis, Leukopenia, Liver Cancer, Lung     cancer, Lymphoma, Melanoma, Myeloma, Neurologic Cancer, Oral Cancer, Ovarian     cancer, Prostate cancer, Skin Cancer, Stomach Cancer, Testicular Cancer,     Thrombocytopenia, Thyroid cancer, Uterine cancer, Other Cancer History, Other     Hematology History            Social History      Lives independently:  Yes      Occupation:  disabled            Tobacco Use      Tobacco status:  Never smoker            Alcohol Use      Alcohol intake:  None            Substance Use      Substance use:  Denies use            REVIEW OF SYSTEMS      General:  Denies: Appetite change, Excessive sweating, Fatigue, Fever, Night     sweats, Weight gain, Weight loss,  Other      Eyes:  Denies: Blurred vision, Corrective lenses, Diplopia, Eye irritation, Eye     pain, Eye redness, Spots in vision, Vision loss, Other      Ears, nose, mouth, throat:  Complains of: Headache, Visual Changes, Denies:     Seizures, Hearing loss, Sinus Congestion, Hoarseness, Sore throat, Other      Cardiovascular:  Denies: Chest pain, Irregular heartbeat, Palpitations, Swollen     ankles/legs, Other      Respiratory:  Denies: Chest pain, Shortness of Air, Productive cough, Coughing     blood, Other      Gastrointestinal:  Denies: Nausea, Vomiting, Problem swallowing, Frequent     heartburn, Constipation, Diarrhea, Tarry stools, Bloody stools, Unable to     control bowels, Other      Kidney/Bladder:  Denies: Painful Urination, Blood in Urine, Incontinence,     Frequent Urination, Decreased Urine Stream, Other      Musculoskeletal:  Denies: New Back pain, Leg Cramps, Painful Joints, Swollen     Joints, Muscle Pain, Muscle weakness, Other      Skin:  DENIES: Bruises Easily, Hair changes, Lesions/changes in moles, Nail     changes, Pigment changes, Rash, Other      Neurological:  Complains of: Dizziness, Denies: Fainting, Numbness\Tingling,     Paralysis, Seizures, Other      Psychiatric:  Complains of: AAO X 3, Denies: Anxiety, Panic attacks, Depression    , Memory loss, Other      Endocrine:  DiabetesThyroid DisorderOsteoporosisEndocrine Other      Hematologic/lymphatic:  Denies: Bruising, Bleeding, Enlarged Lymph Nodes,     Recurrent infections, Other            Vitals            Vital Signs              Date Time  Temp Pulse Resp B/P (MAP) Pulse Ox O2 Delivery O2 Flow Rate FiO2             4/5/18 09:55 98.0 72  110/55 93 ROOM AIR              General Appearance:  Alert, Oriented X3, Cooperative, No acute distress      Eyes:  Anicteric Sclerae, Moist Conjunctiva, PERRLA      HEENT:  Orophraynx clear, No Erythema      Neck:  Supple, Full ROM, No Masses or JVD      Respiratory:  CTAB, Diminished Breath, No  Rales, No Rhonchi      Breast\Chest:  Symmetrical      Abdomen\Gastro:  Soft, No NABS, No Hernias      Cardio:  RRR, No Murmur, No, Peripheral Edema      Skin:  Normal Temperature, Normal Tone, Normal Texture and Turgor      Psychiatric:  Appropriate Affect, Intact Judgement, AAO x3      Neuro:  Cranial Nerve II-XII Inta, No Focal Sensory Deficit      Genitourinary:  No Ryan Catheter, No Bladder Distention      Muscularskeletal:  Normal Gait and Station, Full ROM of extremeties, Full     muscle strength\tone      Extremities:  Pedal Pulses Intact, Pedal Pulses Symetrical, Normal Gait and     station, No No Digital Cyanosis      Lymphatic:  No Cervical, No Supraclavicular, No Infraclavicular, No Axillary            PREVENTION      Hx Influenza Vaccination:  Yes      Date Influenza Vaccine Given:  Oct 1, 2017      Influenza Vaccine Declined:  No      2 or More Falls Past Year?:  No      Fall Past Year with Injury?:  No      Hx Pneumococcal Vaccination:  No      Encouraged to follow-up with:  PCP regarding preventative exams.      Chart initiated by      MICHELLE BARGER MA                 Disclaimer: Converted document may not contain table formatting or lab diagrams. Please see [employer] for the authenticated document.